# Patient Record
Sex: MALE | Race: WHITE | NOT HISPANIC OR LATINO | Employment: OTHER | URBAN - METROPOLITAN AREA
[De-identification: names, ages, dates, MRNs, and addresses within clinical notes are randomized per-mention and may not be internally consistent; named-entity substitution may affect disease eponyms.]

---

## 2018-01-22 NOTE — PATIENT DISCUSSION
This visual field clearly demonstrated a minimum of 48% loss of upper field of vision OU, with upper lid skin in repose and elevated by taping of the lid to demonstrate potential correction. This field shows that taping the lids significantly improved this patient's superior field of vision by approximately 48%, OU.

## 2019-03-14 ENCOUNTER — ESTABLISHED COMPREHENSIVE EXAM (OUTPATIENT)
Dept: URBAN - METROPOLITAN AREA CLINIC 40 | Facility: CLINIC | Age: 74
End: 2019-03-14

## 2019-03-14 DIAGNOSIS — H52.03: ICD-10-CM

## 2019-03-14 DIAGNOSIS — H52.203: ICD-10-CM

## 2019-03-14 DIAGNOSIS — H52.4: ICD-10-CM

## 2019-03-14 PROCEDURE — 92015 DETERMINE REFRACTIVE STATE: CPT

## 2019-03-14 PROCEDURE — 92499OP2 OPTOMAP RETINAL SCREENING BOTH EYES

## 2019-03-14 PROCEDURE — 92014 COMPRE OPH EXAM EST PT 1/>: CPT

## 2019-03-14 ASSESSMENT — TONOMETRY
OD_IOP_MMHG: 14
OS_IOP_MMHG: 14

## 2019-03-14 ASSESSMENT — VISUAL ACUITY
OD_BAT: 20/25
OU_CC: J1+
OS_CC: J1
OS_CC: 20/25-1
OS_BAT: 20/25
OU_CC: 20/20
OD_CC: J1-
OU_SC: 20/40
OD_CC: 20/20

## 2019-03-14 ASSESSMENT — KERATOMETRY
OD_AXISANGLE2_DEGREES: 90
OS_AXISANGLE_DEGREES: 17
OD_K2POWER_DIOPTERS: 41.75
OS_AXISANGLE2_DEGREES: 107
OS_K1POWER_DIOPTERS: 41.75
OD_K1POWER_DIOPTERS: 41.75
OS_K2POWER_DIOPTERS: 41.5
OD_AXISANGLE_DEGREES: 180

## 2020-03-19 ENCOUNTER — ESTABLISHED COMPREHENSIVE EXAM (OUTPATIENT)
Dept: URBAN - METROPOLITAN AREA CLINIC 40 | Facility: CLINIC | Age: 75
End: 2020-03-19

## 2020-03-19 DIAGNOSIS — H52.03: ICD-10-CM

## 2020-03-19 DIAGNOSIS — H52.4: ICD-10-CM

## 2020-03-19 DIAGNOSIS — H52.203: ICD-10-CM

## 2020-03-19 DIAGNOSIS — H25.813: ICD-10-CM

## 2020-03-19 PROCEDURE — 92015 DETERMINE REFRACTIVE STATE: CPT

## 2020-03-19 PROCEDURE — 92499OP2 OPTOMAP RETINAL SCREENING BOTH EYES

## 2020-03-19 PROCEDURE — 92014 COMPRE OPH EXAM EST PT 1/>: CPT

## 2020-03-19 ASSESSMENT — VISUAL ACUITY
OS_CC: J1
OU_CC: 20/20
OS_BAT: 20/30
OD_CC: J1
OD_BAT: 20/25
OD_CC: 20/25
OU_CC: J1+
OS_CC: 20/25

## 2020-03-19 ASSESSMENT — KERATOMETRY
OS_AXISANGLE2_DEGREES: 107
OD_K1POWER_DIOPTERS: 41.75
OS_AXISANGLE_DEGREES: 14
OS_K1POWER_DIOPTERS: 41.75
OD_AXISANGLE_DEGREES: 180
OS_K2POWER_DIOPTERS: 41.25
OD_AXISANGLE2_DEGREES: 90
OS_AXISANGLE2_DEGREES: 104
OD_AXISANGLE_DEGREES: 26
OD_K2POWER_DIOPTERS: 41.75
OS_K2POWER_DIOPTERS: 41.5
OS_AXISANGLE_DEGREES: 17
OD_AXISANGLE2_DEGREES: 116
OD_K2POWER_DIOPTERS: 42

## 2020-03-19 ASSESSMENT — TONOMETRY
OD_IOP_MMHG: 11
OS_IOP_MMHG: 11

## 2021-03-17 ENCOUNTER — ESTABLISHED COMPREHENSIVE EXAM (OUTPATIENT)
Dept: URBAN - METROPOLITAN AREA CLINIC 40 | Facility: CLINIC | Age: 76
End: 2021-03-17

## 2021-03-17 DIAGNOSIS — H25.813: ICD-10-CM

## 2021-03-17 DIAGNOSIS — H52.4: ICD-10-CM

## 2021-03-17 DIAGNOSIS — H52.03: ICD-10-CM

## 2021-03-17 DIAGNOSIS — H52.203: ICD-10-CM

## 2021-03-17 PROCEDURE — 92499OP2 OPTOMAP RETINAL SCREENING BOTH EYES

## 2021-03-17 PROCEDURE — 92015 DETERMINE REFRACTIVE STATE: CPT

## 2021-03-17 PROCEDURE — 92014 COMPRE OPH EXAM EST PT 1/>: CPT

## 2021-03-17 ASSESSMENT — VISUAL ACUITY
OU_CC: 20/25-2
OS_SC: 20/25-2
OS_CC: J1+
OD_CC: J1+
OS_BAT: 20/40
OD_SC: 20/30-1
OD_BAT: 20/50
OD_CC: 20/25
OU_SC: 20/25
OS_CC: 20/40
OU_CC: J1+

## 2021-03-17 ASSESSMENT — KERATOMETRY
OS_K1POWER_DIOPTERS: 41.75
OD_K1POWER_DIOPTERS: 41.75
OD_AXISANGLE2_DEGREES: 116
OD_K2POWER_DIOPTERS: 41.75
OS_AXISANGLE2_DEGREES: 107
OD_AXISANGLE_DEGREES: 180
OS_K2POWER_DIOPTERS: 41.5
OS_AXISANGLE_DEGREES: 17
OS_AXISANGLE_DEGREES: 14
OS_AXISANGLE2_DEGREES: 104
OD_AXISANGLE2_DEGREES: 90
OD_AXISANGLE_DEGREES: 26
OS_K2POWER_DIOPTERS: 41.25
OD_K2POWER_DIOPTERS: 42

## 2021-03-17 ASSESSMENT — TONOMETRY
OS_IOP_MMHG: 12
OD_IOP_MMHG: 12

## 2022-03-22 ASSESSMENT — KERATOMETRY
OD_AXISANGLE_DEGREES: 180
OS_K1POWER_DIOPTERS: 41.75
OD_AXISANGLE2_DEGREES: 90
OD_AXISANGLE_DEGREES: 26
OS_AXISANGLE2_DEGREES: 107
OD_K1POWER_DIOPTERS: 41.75
OS_AXISANGLE_DEGREES: 17
OD_AXISANGLE2_DEGREES: 116
OD_K2POWER_DIOPTERS: 42
OD_K2POWER_DIOPTERS: 41.75
OS_AXISANGLE2_DEGREES: 104
OS_K2POWER_DIOPTERS: 41.25
OS_AXISANGLE_DEGREES: 14
OS_K2POWER_DIOPTERS: 41.5

## 2022-03-23 ENCOUNTER — COMPREHENSIVE EXAM (OUTPATIENT)
Dept: URBAN - METROPOLITAN AREA CLINIC 40 | Facility: CLINIC | Age: 77
End: 2022-03-23

## 2022-03-23 DIAGNOSIS — H02.88B: ICD-10-CM

## 2022-03-23 DIAGNOSIS — H52.203: ICD-10-CM

## 2022-03-23 DIAGNOSIS — H02.88A: ICD-10-CM

## 2022-03-23 DIAGNOSIS — H52.4: ICD-10-CM

## 2022-03-23 DIAGNOSIS — H52.03: ICD-10-CM

## 2022-03-23 DIAGNOSIS — H25.813: ICD-10-CM

## 2022-03-23 PROCEDURE — 92015 DETERMINE REFRACTIVE STATE: CPT

## 2022-03-23 PROCEDURE — 92499OP2 OPTOMAP RETINAL SCREENING BOTH EYES

## 2022-03-23 PROCEDURE — 92014 COMPRE OPH EXAM EST PT 1/>: CPT

## 2022-03-23 ASSESSMENT — VISUAL ACUITY
OU_SC: 20/25
OU_CC: J1+
OS_BAT: 20/50
OD_CC: 20/200
OS_SC: J1
OD_BAT: 20/40
OS_SC: 20/40
OU_CC: 20/40-1
OU_SC: J1+
OS_CC: 20/60
OD_SC: 20/20-1
OD_SC: J1
OD_CC: J1+
OS_CC: J1+

## 2022-03-23 ASSESSMENT — TONOMETRY
OD_IOP_MMHG: 12
OS_IOP_MMHG: 13

## 2023-04-13 ENCOUNTER — COMPREHENSIVE EXAM (OUTPATIENT)
Dept: URBAN - METROPOLITAN AREA CLINIC 40 | Facility: CLINIC | Age: 78
End: 2023-04-13

## 2023-04-13 DIAGNOSIS — H02.88B: ICD-10-CM

## 2023-04-13 DIAGNOSIS — H52.03: ICD-10-CM

## 2023-04-13 DIAGNOSIS — H02.88A: ICD-10-CM

## 2023-04-13 DIAGNOSIS — H52.4: ICD-10-CM

## 2023-04-13 DIAGNOSIS — H52.203: ICD-10-CM

## 2023-04-13 DIAGNOSIS — H25.813: ICD-10-CM

## 2023-04-13 PROCEDURE — 92015 DETERMINE REFRACTIVE STATE: CPT

## 2023-04-13 PROCEDURE — 92014 COMPRE OPH EXAM EST PT 1/>: CPT

## 2023-04-13 PROCEDURE — 92499OP2 OPTOMAP RETINAL SCREENING BOTH EYES

## 2023-04-13 ASSESSMENT — KERATOMETRY
OD_AXISANGLE_DEGREES: 180
OD_K1POWER_DIOPTERS: 41.75
OS_K2POWER_DIOPTERS: 41.25
OD_K2POWER_DIOPTERS: 42
OS_K1POWER_DIOPTERS: 41.75
OD_AXISANGLE2_DEGREES: 116
OD_AXISANGLE2_DEGREES: 90
OD_AXISANGLE_DEGREES: 26
OS_AXISANGLE_DEGREES: 17
OS_AXISANGLE2_DEGREES: 104
OD_K2POWER_DIOPTERS: 41.75
OS_AXISANGLE_DEGREES: 14
OS_K2POWER_DIOPTERS: 41.5
OS_AXISANGLE2_DEGREES: 107

## 2023-04-13 ASSESSMENT — TONOMETRY
OD_IOP_MMHG: 12
OS_IOP_MMHG: 12

## 2023-04-13 ASSESSMENT — VISUAL ACUITY
OS_SC: 20/40
OS_SC: J3
OD_SC: J4
OD_SC: 20/40

## 2023-04-25 ENCOUNTER — CONSULTATION/EVALUATION (OUTPATIENT)
Dept: URBAN - METROPOLITAN AREA CLINIC 43 | Facility: CLINIC | Age: 78
End: 2023-04-25

## 2023-04-25 DIAGNOSIS — H02.88B: ICD-10-CM

## 2023-04-25 DIAGNOSIS — H02.88A: ICD-10-CM

## 2023-04-25 DIAGNOSIS — H25.813: ICD-10-CM

## 2023-04-25 DIAGNOSIS — H18.513: ICD-10-CM

## 2023-04-25 PROCEDURE — 92136TC INTERFEROMETRY - TECHNICAL COMPONENT

## 2023-04-25 PROCEDURE — 92250 FUNDUS PHOTOGRAPHY W/I&R: CPT

## 2023-04-25 PROCEDURE — 99214 OFFICE O/P EST MOD 30 MIN: CPT

## 2023-04-25 PROCEDURE — 92286 ANT SGM IMG I&R SPECLR MIC: CPT

## 2023-04-25 PROCEDURE — 92025-1 CORNEAL TOPOGRAPHY, INS

## 2023-04-25 PROCEDURE — 92134 CPTRZ OPH DX IMG PST SGM RTA: CPT

## 2023-04-25 PROCEDURE — V2799PMN IMPRIMIS PRED-MOXI-NEPAF 5ML

## 2023-04-25 ASSESSMENT — TONOMETRY
OS_IOP_MMHG: 15
OD_IOP_MMHG: 13

## 2023-04-25 ASSESSMENT — VISUAL ACUITY
OS_SC: 20/50
OS_SC: J2
OD_SC: J3+1
OD_SC: 20/40
OD_PH: 20/20
OS_PH: 20/25
OD_BAT: 20/50
OS_BAT: 20/50

## 2023-04-26 ENCOUNTER — SURGERY/PROCEDURE (OUTPATIENT)
Dept: URBAN - METROPOLITAN AREA CLINIC 43 | Facility: CLINIC | Age: 78
End: 2023-04-26

## 2023-04-26 DIAGNOSIS — H25.813: ICD-10-CM

## 2023-04-26 PROCEDURE — 66999LNSR LENSAR LASER FOR CAT SX

## 2023-04-26 PROCEDURE — 65772LRI LRI DURING CAT SX

## 2023-04-26 PROCEDURE — 66984CV REMOVE CATARACT, INSERT LENS, CUSTOM VISION

## 2023-04-27 ENCOUNTER — POST-OP (OUTPATIENT)
Dept: URBAN - METROPOLITAN AREA CLINIC 43 | Facility: CLINIC | Age: 78
End: 2023-04-27

## 2023-04-27 DIAGNOSIS — Z96.1: ICD-10-CM

## 2023-04-27 DIAGNOSIS — H25.811: ICD-10-CM

## 2023-04-27 PROCEDURE — 99024 POSTOP FOLLOW-UP VISIT: CPT

## 2023-04-27 ASSESSMENT — TONOMETRY
OD_IOP_MMHG: 14
OS_IOP_MMHG: 15

## 2023-04-27 ASSESSMENT — VISUAL ACUITY
OS_SC: 20/200
OD_SC: 20/40

## 2023-05-01 ENCOUNTER — POST OP/EVAL OF SECOND EYE (OUTPATIENT)
Dept: URBAN - METROPOLITAN AREA CLINIC 43 | Facility: CLINIC | Age: 78
End: 2023-05-01

## 2023-05-01 DIAGNOSIS — Z96.1: ICD-10-CM

## 2023-05-01 DIAGNOSIS — H18.513: ICD-10-CM

## 2023-05-01 DIAGNOSIS — H25.811: ICD-10-CM

## 2023-05-01 PROCEDURE — 99024 POSTOP FOLLOW-UP VISIT: CPT

## 2023-05-01 PROCEDURE — 92012 INTRM OPH EXAM EST PATIENT: CPT

## 2023-05-01 ASSESSMENT — VISUAL ACUITY
OD_SC: 20/40+1
OS_SC: 20/20

## 2023-05-01 ASSESSMENT — TONOMETRY
OD_IOP_MMHG: 13
OS_IOP_MMHG: 14

## 2023-05-10 ENCOUNTER — SURGERY/PROCEDURE (OUTPATIENT)
Dept: URBAN - METROPOLITAN AREA CLINIC 43 | Facility: CLINIC | Age: 78
End: 2023-05-10

## 2023-05-10 DIAGNOSIS — H25.811: ICD-10-CM

## 2023-05-10 PROCEDURE — 66984CV REMOVE CATARACT, INSERT LENS, CUSTOM VISION

## 2023-05-10 PROCEDURE — 66999LNSR LENSAR LASER FOR CAT SX

## 2023-05-11 ENCOUNTER — POST-OP (OUTPATIENT)
Dept: URBAN - METROPOLITAN AREA CLINIC 43 | Facility: CLINIC | Age: 78
End: 2023-05-11

## 2023-05-11 DIAGNOSIS — Z96.1: ICD-10-CM

## 2023-05-11 PROCEDURE — 99024 POSTOP FOLLOW-UP VISIT: CPT

## 2023-05-11 ASSESSMENT — VISUAL ACUITY
OD_SC: 20/25
OS_SC: 20/20
OD_SC: J3
OS_SC: J3

## 2023-05-11 ASSESSMENT — TONOMETRY
OS_IOP_MMHG: 13
OD_IOP_MMHG: 14

## 2023-05-31 ENCOUNTER — POST-OP (OUTPATIENT)
Dept: URBAN - METROPOLITAN AREA CLINIC 43 | Facility: CLINIC | Age: 78
End: 2023-05-31

## 2023-05-31 DIAGNOSIS — Z96.1: ICD-10-CM

## 2023-05-31 PROCEDURE — 99024 POSTOP FOLLOW-UP VISIT: CPT

## 2023-05-31 ASSESSMENT — VISUAL ACUITY
OS_SC: J4-
OD_SC: 20/30
OD_SC: J1-
OS_SC: 20/20-2

## 2023-05-31 ASSESSMENT — TONOMETRY
OS_IOP_MMHG: 13
OD_IOP_MMHG: 13

## 2023-09-20 ENCOUNTER — COMPREHENSIVE EXAM (OUTPATIENT)
Dept: URBAN - METROPOLITAN AREA CLINIC 43 | Facility: CLINIC | Age: 78
End: 2023-09-20

## 2023-09-20 DIAGNOSIS — Z96.1: ICD-10-CM

## 2023-09-20 DIAGNOSIS — H26.493: ICD-10-CM

## 2023-09-20 DIAGNOSIS — H04.123: ICD-10-CM

## 2023-09-20 DIAGNOSIS — H18.513: ICD-10-CM

## 2023-09-20 PROCEDURE — 92015 DETERMINE REFRACTIVE STATE: CPT

## 2023-09-20 PROCEDURE — 92014 COMPRE OPH EXAM EST PT 1/>: CPT

## 2023-09-20 ASSESSMENT — TONOMETRY
OS_IOP_MMHG: 10
OD_IOP_MMHG: 10

## 2023-09-20 ASSESSMENT — VISUAL ACUITY
OS_SC: J2
OD_BAT: 20/30
OD_SC: 20/30
OD_SC: J1
OS_SC: 20/25
OS_BAT: 20/30

## 2023-09-21 ENCOUNTER — SURGERY/PROCEDURE (OUTPATIENT)
Dept: URBAN - METROPOLITAN AREA SURGERY 14 | Facility: SURGERY | Age: 78
End: 2023-09-21

## 2023-09-21 ENCOUNTER — ESTABLISHED PATIENT (OUTPATIENT)
Dept: URBAN - METROPOLITAN AREA CLINIC 39 | Facility: CLINIC | Age: 78
End: 2023-09-21

## 2023-09-21 DIAGNOSIS — H26.493: ICD-10-CM

## 2023-09-21 PROCEDURE — 6682150 YAG CAPSULOTOMY

## 2023-09-21 PROCEDURE — 99214 OFFICE O/P EST MOD 30 MIN: CPT

## 2023-09-21 ASSESSMENT — VISUAL ACUITY
OU_SC: J1
OD_BAT: 20/30
OD_SC: 20/30
OD_SC: J1
OS_BAT: 20/30
OS_SC: 20/25+1
OS_SC: J3

## 2023-09-21 ASSESSMENT — TONOMETRY
OD_IOP_MMHG: 7
OS_IOP_MMHG: 6

## 2023-09-27 ENCOUNTER — FOLLOW UP (OUTPATIENT)
Dept: URBAN - METROPOLITAN AREA CLINIC 43 | Facility: CLINIC | Age: 78
End: 2023-09-27

## 2023-09-27 DIAGNOSIS — Z98.890: ICD-10-CM

## 2023-09-27 PROCEDURE — 99024 POSTOP FOLLOW-UP VISIT: CPT

## 2023-09-27 ASSESSMENT — TONOMETRY
OD_IOP_MMHG: 10
OS_IOP_MMHG: 10

## 2023-09-27 ASSESSMENT — VISUAL ACUITY
OU_SC: 20/20
OD_SC: 20/25
OS_SC: 20/25+1
OU_SC: J1
OD_SC: J1

## 2024-09-19 ENCOUNTER — COMPREHENSIVE EXAM (OUTPATIENT)
Dept: URBAN - METROPOLITAN AREA CLINIC 43 | Facility: CLINIC | Age: 79
End: 2024-09-19

## 2024-09-19 DIAGNOSIS — Z96.1: ICD-10-CM

## 2024-09-19 DIAGNOSIS — H04.123: ICD-10-CM

## 2024-09-19 DIAGNOSIS — H18.513: ICD-10-CM

## 2024-09-19 PROCEDURE — 92014 COMPRE OPH EXAM EST PT 1/>: CPT

## 2024-09-19 PROCEDURE — 92015 DETERMINE REFRACTIVE STATE: CPT

## 2025-01-08 ENCOUNTER — EMERGENCY VISIT (OUTPATIENT)
Age: 80
End: 2025-01-08

## 2025-01-08 DIAGNOSIS — H04.123: ICD-10-CM

## 2025-01-08 DIAGNOSIS — H10.11: ICD-10-CM

## 2025-01-08 PROCEDURE — 92012 INTRM OPH EXAM EST PATIENT: CPT

## 2025-01-22 ENCOUNTER — FOLLOW UP (OUTPATIENT)
Age: 80
End: 2025-01-22

## 2025-01-22 DIAGNOSIS — H10.11: ICD-10-CM

## 2025-01-22 PROCEDURE — 92012 INTRM OPH EXAM EST PATIENT: CPT
